# Patient Record
Sex: FEMALE | Race: BLACK OR AFRICAN AMERICAN | Employment: UNEMPLOYED | URBAN - METROPOLITAN AREA
[De-identification: names, ages, dates, MRNs, and addresses within clinical notes are randomized per-mention and may not be internally consistent; named-entity substitution may affect disease eponyms.]

---

## 2021-08-07 ENCOUNTER — OFFICE VISIT (OUTPATIENT)
Dept: URGENT CARE | Facility: URGENT CARE | Age: 41
End: 2021-08-07
Payer: COMMERCIAL

## 2021-08-07 VITALS
WEIGHT: 205 LBS | OXYGEN SATURATION: 100 % | HEART RATE: 65 BPM | SYSTOLIC BLOOD PRESSURE: 104 MMHG | RESPIRATION RATE: 20 BRPM | TEMPERATURE: 98.8 F | DIASTOLIC BLOOD PRESSURE: 70 MMHG

## 2021-08-07 DIAGNOSIS — B00.1 COLD SORE: Primary | ICD-10-CM

## 2021-08-07 PROCEDURE — 99203 OFFICE O/P NEW LOW 30 MIN: CPT | Performed by: FAMILY MEDICINE

## 2021-08-07 RX ORDER — VALACYCLOVIR HYDROCHLORIDE 1 G/1
2000 TABLET, FILM COATED ORAL 2 TIMES DAILY
Qty: 4 TABLET | Refills: 1 | Status: SHIPPED | OUTPATIENT
Start: 2021-08-07 | End: 2021-08-08

## 2021-08-07 NOTE — LETTER
[facility]  [address] [address]  [phone]      August 7, 2021    RE:  Keren Rosario                                                                                                                                                       [address]  [facility] [id number]            To whom it may concern:    Keren Rosario is under my professional care for Medical.    She  may return to work with the following: No working or lifting restrictions on or about Thursday.          Sincerely,        Abimael Daley DO    [facility]

## 2021-08-23 NOTE — PROGRESS NOTES
SUBJECTIVE: Keren Roasrio is a 40 year old female presenting with a chief complaint of rash lips.  Onset of symptoms was day(s) ago.  Course of illness is worsening.      History reviewed. No pertinent past medical history.  No Known Allergies  Social History     Tobacco Use     Smoking status: Never Smoker     Smokeless tobacco: Never Used   Substance Use Topics     Alcohol use: Yes       ROS:  SKIN: no rash  GI: no vomiting    OBJECTIVE:  /70   Pulse 65   Temp 98.8  F (37.1  C)   Resp 20   Wt 93 kg (205 lb)   LMP  (LMP Unknown)   SpO2 100% GENERAL APPEARANCE: healthy, alert and no distress  EYES: EOMI,  PERRL, conjunctiva clear  SKIN: blisters      ICD-10-CM    1. Cold sore  B00.1 valACYclovir (VALTREX) 1000 mg tablet       Fluids/Rest, f/u if worse/not any better

## 2022-07-06 PROCEDURE — 88305 TISSUE EXAM BY PATHOLOGIST: CPT | Performed by: PATHOLOGY

## 2022-07-07 ENCOUNTER — LAB REQUISITION (OUTPATIENT)
Dept: LAB | Facility: CLINIC | Age: 42
End: 2022-07-07

## 2022-07-08 LAB
PATH REPORT.COMMENTS IMP SPEC: NORMAL
PATH REPORT.COMMENTS IMP SPEC: NORMAL
PATH REPORT.FINAL DX SPEC: NORMAL
PATH REPORT.GROSS SPEC: NORMAL
PATH REPORT.MICROSCOPIC SPEC OTHER STN: NORMAL
PATH REPORT.RELEVANT HX SPEC: NORMAL
PHOTO IMAGE: NORMAL